# Patient Record
Sex: MALE
[De-identification: names, ages, dates, MRNs, and addresses within clinical notes are randomized per-mention and may not be internally consistent; named-entity substitution may affect disease eponyms.]

---

## 2019-08-26 PROBLEM — Z00.00 ENCOUNTER FOR PREVENTIVE HEALTH EXAMINATION: Status: ACTIVE | Noted: 2019-08-26

## 2019-08-27 ENCOUNTER — APPOINTMENT (OUTPATIENT)
Dept: PEDIATRIC ADOLESCENT MEDICINE | Facility: HOSPITAL | Age: 19
End: 2019-08-27

## 2019-09-11 ENCOUNTER — APPOINTMENT (OUTPATIENT)
Dept: PEDIATRIC ADOLESCENT MEDICINE | Facility: CLINIC | Age: 19
End: 2019-09-11

## 2020-10-28 ENCOUNTER — APPOINTMENT (OUTPATIENT)
Dept: DERMATOLOGY | Facility: CLINIC | Age: 20
End: 2020-10-28
Payer: SELF-PAY

## 2020-10-28 ENCOUNTER — APPOINTMENT (OUTPATIENT)
Dept: DERMATOLOGY | Facility: CLINIC | Age: 20
End: 2020-10-28

## 2020-10-28 ENCOUNTER — NON-APPOINTMENT (OUTPATIENT)
Age: 20
End: 2020-10-28

## 2020-10-28 VITALS — BODY MASS INDEX: 26.95 KG/M2 | WEIGHT: 210 LBS | HEIGHT: 74 IN

## 2020-10-28 DIAGNOSIS — B36.0 PITYRIASIS VERSICOLOR: ICD-10-CM

## 2020-10-28 PROCEDURE — 99203 OFFICE O/P NEW LOW 30 MIN: CPT

## 2020-10-28 RX ORDER — KETOCONAZOLE 20.5 MG/ML
2 SHAMPOO, SUSPENSION TOPICAL
Qty: 1 | Refills: 6 | Status: ACTIVE | COMMUNITY
Start: 2020-10-28 | End: 1900-01-01

## 2020-10-28 RX ORDER — FLUCONAZOLE 200 MG/1
200 TABLET ORAL
Qty: 7 | Refills: 0 | Status: ACTIVE | COMMUNITY
Start: 2020-10-28 | End: 1900-01-01

## 2021-04-15 ENCOUNTER — INPATIENT (INPATIENT)
Facility: HOSPITAL | Age: 21
LOS: 0 days | Discharge: HOME | DRG: 863 | End: 2021-04-16
Payer: COMMERCIAL

## 2021-04-15 VITALS
OXYGEN SATURATION: 97 % | HEART RATE: 80 BPM | SYSTOLIC BLOOD PRESSURE: 132 MMHG | WEIGHT: 210.1 LBS | RESPIRATION RATE: 18 BRPM | TEMPERATURE: 98 F | HEIGHT: 74 IN | DIASTOLIC BLOOD PRESSURE: 80 MMHG

## 2021-04-15 DIAGNOSIS — L05.01 PILONIDAL CYST WITH ABSCESS: ICD-10-CM

## 2021-04-15 DIAGNOSIS — Z98.890 OTHER SPECIFIED POSTPROCEDURAL STATES: Chronic | ICD-10-CM

## 2021-04-15 LAB
ALBUMIN SERPL ELPH-MCNC: 4.9 G/DL — SIGNIFICANT CHANGE UP (ref 3.3–5)
ALP SERPL-CCNC: 61 U/L — SIGNIFICANT CHANGE UP (ref 40–120)
ALT FLD-CCNC: 14 U/L — SIGNIFICANT CHANGE UP (ref 10–45)
ANION GAP SERPL CALC-SCNC: 11 MMOL/L — SIGNIFICANT CHANGE UP (ref 5–17)
AST SERPL-CCNC: 19 U/L — SIGNIFICANT CHANGE UP (ref 10–40)
BASOPHILS # BLD AUTO: 0.09 K/UL — SIGNIFICANT CHANGE UP (ref 0–0.2)
BASOPHILS NFR BLD AUTO: 0.7 % — SIGNIFICANT CHANGE UP (ref 0–2)
BILIRUB SERPL-MCNC: 0.2 MG/DL — SIGNIFICANT CHANGE UP (ref 0.2–1.2)
BUN SERPL-MCNC: 13 MG/DL — SIGNIFICANT CHANGE UP (ref 7–23)
CALCIUM SERPL-MCNC: 9.7 MG/DL — SIGNIFICANT CHANGE UP (ref 8.4–10.5)
CHLORIDE SERPL-SCNC: 100 MMOL/L — SIGNIFICANT CHANGE UP (ref 96–108)
CO2 SERPL-SCNC: 26 MMOL/L — SIGNIFICANT CHANGE UP (ref 22–31)
CREAT SERPL-MCNC: 1.01 MG/DL — SIGNIFICANT CHANGE UP (ref 0.5–1.3)
EOSINOPHIL # BLD AUTO: 0.1 K/UL — SIGNIFICANT CHANGE UP (ref 0–0.5)
EOSINOPHIL NFR BLD AUTO: 0.7 % — SIGNIFICANT CHANGE UP (ref 0–6)
GLUCOSE SERPL-MCNC: 100 MG/DL — HIGH (ref 70–99)
HCT VFR BLD CALC: 44.3 % — SIGNIFICANT CHANGE UP (ref 39–50)
HGB BLD-MCNC: 15.1 G/DL — SIGNIFICANT CHANGE UP (ref 13–17)
IMM GRANULOCYTES NFR BLD AUTO: 1.3 % — SIGNIFICANT CHANGE UP (ref 0–1.5)
LYMPHOCYTES # BLD AUTO: 25.2 % — SIGNIFICANT CHANGE UP (ref 13–44)
LYMPHOCYTES # BLD AUTO: 3.39 K/UL — HIGH (ref 1–3.3)
MCHC RBC-ENTMCNC: 29.8 PG — SIGNIFICANT CHANGE UP (ref 27–34)
MCHC RBC-ENTMCNC: 34.1 GM/DL — SIGNIFICANT CHANGE UP (ref 32–36)
MCV RBC AUTO: 87.4 FL — SIGNIFICANT CHANGE UP (ref 80–100)
MONOCYTES # BLD AUTO: 1.32 K/UL — HIGH (ref 0–0.9)
MONOCYTES NFR BLD AUTO: 9.8 % — SIGNIFICANT CHANGE UP (ref 2–14)
NEUTROPHILS # BLD AUTO: 8.39 K/UL — HIGH (ref 1.8–7.4)
NEUTROPHILS NFR BLD AUTO: 62.3 % — SIGNIFICANT CHANGE UP (ref 43–77)
NRBC # BLD: 0 /100 WBCS — SIGNIFICANT CHANGE UP (ref 0–0)
PLATELET # BLD AUTO: 457 K/UL — HIGH (ref 150–400)
POTASSIUM SERPL-MCNC: 3.9 MMOL/L — SIGNIFICANT CHANGE UP (ref 3.5–5.3)
POTASSIUM SERPL-SCNC: 3.9 MMOL/L — SIGNIFICANT CHANGE UP (ref 3.5–5.3)
PROT SERPL-MCNC: 8.3 G/DL — SIGNIFICANT CHANGE UP (ref 6–8.3)
RBC # BLD: 5.07 M/UL — SIGNIFICANT CHANGE UP (ref 4.2–5.8)
RBC # FLD: 11.5 % — SIGNIFICANT CHANGE UP (ref 10.3–14.5)
SODIUM SERPL-SCNC: 137 MMOL/L — SIGNIFICANT CHANGE UP (ref 135–145)
WBC # BLD: 13.47 K/UL — HIGH (ref 3.8–10.5)
WBC # FLD AUTO: 13.47 K/UL — HIGH (ref 3.8–10.5)

## 2021-04-15 PROCEDURE — 99285 EMERGENCY DEPT VISIT HI MDM: CPT

## 2021-04-15 PROCEDURE — 74177 CT ABD & PELVIS W/CONTRAST: CPT | Mod: 26,MG

## 2021-04-15 PROCEDURE — G1004: CPT

## 2021-04-15 PROCEDURE — 99232 SBSQ HOSP IP/OBS MODERATE 35: CPT | Mod: GC

## 2021-04-15 RX ORDER — CIPROFLOXACIN LACTATE 400MG/40ML
400 VIAL (ML) INTRAVENOUS EVERY 12 HOURS
Refills: 0 | Status: DISCONTINUED | OUTPATIENT
Start: 2021-04-15 | End: 2021-04-16

## 2021-04-15 RX ORDER — CIPROFLOXACIN LACTATE 400MG/40ML
400 VIAL (ML) INTRAVENOUS ONCE
Refills: 0 | Status: COMPLETED | OUTPATIENT
Start: 2021-04-15 | End: 2021-04-15

## 2021-04-15 RX ORDER — ENOXAPARIN SODIUM 100 MG/ML
40 INJECTION SUBCUTANEOUS DAILY
Refills: 0 | Status: DISCONTINUED | OUTPATIENT
Start: 2021-04-15 | End: 2021-04-16

## 2021-04-15 RX ORDER — METRONIDAZOLE 500 MG
500 TABLET ORAL EVERY 8 HOURS
Refills: 0 | Status: DISCONTINUED | OUTPATIENT
Start: 2021-04-15 | End: 2021-04-16

## 2021-04-15 RX ORDER — METRONIDAZOLE 500 MG
500 TABLET ORAL ONCE
Refills: 0 | Status: COMPLETED | OUTPATIENT
Start: 2021-04-15 | End: 2021-04-15

## 2021-04-15 RX ADMIN — Medication 200 MILLIGRAM(S): at 21:53

## 2021-04-15 NOTE — H&P ADULT - HISTORY OF PRESENT ILLNESS
20y M with history of pilonidal cyst presents to ED with c/o worsening drainage and chills s/p Pilonidal Cystectomy on 4/5/21 at outside hospital in [place]. Pt reports drainage x 3 days and described it as a brown and red color. Reports the pain as improved since the surgery but the drainage has persisted. Pt endorses chills, and night sweats. States his school physician has been changing his bandages and referred him to ED for further evaluation. Has been taking Tylenol 2-3 times a day for pain management and states his last dose was at 3pm today. Pt states he still has stiches in and has an appointment to get them removed earlier next week. States his surgeon started him on Bactrim yesterday. States he is allergic to Amoxicillin. [place] surgeon Dr. Judd Shea [phone]

## 2021-04-15 NOTE — ED ADULT NURSE NOTE - NSSEPSISSUSPECTED_ED_A_ED
Patient calling that she needs a referral to see a high risk OB physician.  Patient is 5 weeks pregnant.  Please advise if you want to see her prior.   No

## 2021-04-15 NOTE — H&P ADULT - NSHPPHYSICALEXAM_GEN_ALL_CORE
Gen: NAD, WN/WD  HEENT: No scleral icterus, hearing grossly intact  Neck: Supple, trachea midline  Lung: Non-labored respirations  Heart: RRR  GI: Soft, non-tender, non-distended\  Rectal: pilonidal wound with stitches, purulent drainage from middle of incision, erythematous  Extrem: WWP, no edema, palpable pulses bilaterally  Neuro: A&O x3, moves all extremities spontaneously, follows commands

## 2021-04-15 NOTE — ED CLERICAL - NS ED CLERK NOTE PRE-ARRIVAL INFORMATION; ADDITIONAL PRE-ARRIVAL INFORMATION
CC/Reason For referral: Patient is s/p pilonidal cystectomy on 4/5/21. now with increasing drainage and sweats. Started on bactrim yesterday after speaking with surgeon in [place]. drainage worse today. Sent to r/o abscess.  Preferred Consultant(if applicable): francis  Who admits for you (if needed): na  Do you have documents you would like to fax over? no  Would you still like to speak to an ED attending? if needed

## 2021-04-15 NOTE — ED ADULT NURSE NOTE - OBJECTIVE STATEMENT
20y M, A&Ox4, ambulatory, presents to the ED c/o increased drainage and chills s/p Pilonidal Cystectomy on 4/5/21 at outside hospital in [place]. Pt reports increased drainage x3 days. Drainage is thick and brownish/ red in color. Pt states pain has improved since the surgery. Pt also endorses chills, and night sweats. States his school MD has been changing his bandages and referred him to ED for further evaluation. Has been taking Tylenol 2-3 times a day for pain- last dose yesterday 3pm. Pt states he still has stitches in and has an appointment to get them removed next week. States his surgeon started him on Bactrim yesterday. Gross neuro intact, no difficulty speaking in complete sentences, pulses x 4, moving all extremities, abdomen soft nontender nondistended. Pt denies numbness/tingling, weakness, headache, dizziness, vision changes, cp, sob, cough, abd pain, n/v/d, dysuria, hematuria, bloody stools, back pain.

## 2021-04-15 NOTE — ED PROVIDER NOTE - RAPID ASSESSMENT
20 year old CARLOS marie presents to ED c/o worsening drainage s/p pilonidal cystectomy on 4/5/21 at outside hospital in [place]. Pt states reports drainage x3 days, described as brown and red in color. Pt reports that pain has improved since surgery but drainage has persisted. Pt endorses subjective fevers, chills, and night sweats. Has been taking Tylenol, last dose at 3pm. Seen by MD in [facility] and referred to ED for further eval. Pt deferring pain meds at this time. Denies nausea and vomiting     Patient was seen as a tele QDOC patient. The patient will be seen and further worked up in the main emergency department and their care will be completed by the main emergency department team along with a thorough physical exam. Receiving team will follow up on labs, analgesia, any clinical imaging, reassess and disposition as clinically indicated, all decisions regarding the progression of care will be made at their discretion.    Scribe Statement: Haley SÁNCHEZ, attest that this documentation has been prepared under the direction and in the presence of Jes Muñoz) 20 year old CARLOS marie presents to ED c/o worsening drainage and chills s/p pilonidal cystectomy on 4/5/21 at outside hospital in [place]. Pt states reports drainage x3 days, described as brown and red in color. Pt reports that pain has improved since surgery but drainage has persisted. Pt endorses subjective fevers, chills, and night sweats. Has been taking Tylenol, last dose at 3pm. Seen by MD in [facility] and referred to ED for further eval. Pt deferring pain meds at this time. Denies nausea and vomiting.    Patient was seen as a tele QDOC patient. The patient will be seen and further worked up in the main emergency department and their care will be completed by the main emergency department team along with a thorough physical exam. Receiving team will follow up on labs, analgesia, any clinical imaging, reassess and disposition as clinically indicated, all decisions regarding the progression of care will be made at their discretion.    Scribe Statement: I, Haley Frazier, attest that this documentation has been prepared under the direction and in the presence of Jes Muñoz (PA)    Jes SÁNCHEZ PA-C, personally performed the service described in the documentation recorded by the scribe in my presence, and it accurately and completely records my words and actions.

## 2021-04-15 NOTE — ED PROVIDER NOTE - OBJECTIVE STATEMENT
20y M pt presents to ED with c/o worsening drainage and chills s/p Pilonidal Cystectomy on 4/5/21 at outside hospital in [place]. Pt reports drainage x 3 days and described it as a brown and red color. Reports the pain as improved since the surgery but the drainage has persisted. Pt endorses chills, and night sweats. States his school physician has been changing his bandages and referred him to ED for further evaluation. Has been taking Tylenol 2-3 times a day for pain management and states his last dose was at 3pm today. Pt states he still has stiches in and has an appointment to get them removed earlier next week. States his surgeon started him on Bactrim yesterday. States he is allergic to Amoxicillin. 20y M pt presents to ED with c/o worsening drainage and chills s/p Pilonidal Cystectomy on 4/5/21 at outside hospital in [place]. Pt reports drainage x 3 days and described it as a brown and red color. Reports the pain as improved since the surgery but the drainage has persisted. Pt endorses chills, and night sweats. States his school physician has been changing his bandages and referred him to ED for further evaluation. Has been taking Tylenol 2-3 times a day for pain management and states his last dose was at 3pm today. Pt states he still has stiches in and has an appointment to get them removed earlier next week. States his surgeon started him on Bactrim yesterday. States he is allergic to Amoxicillin.  [place] surgeon Dr. Judd Shea [phone]

## 2021-04-15 NOTE — ED PROVIDER NOTE - PHYSICAL EXAMINATION
I have reviewed the triage vital signs.  Const: AAOx3, in NAD  Eyes: no conjunctival injection  HENT: NCAT, Neck supple  CV: RRR, +S1, S2  Resp: CTAB, no respiratory distress  GI: Abdomen soft, NTND, no guarding. Midline vertical surgical site in buttocks crease superior to anus, no swelling/erythema, mild TTP along caudal aspect, purulent, malodorous drainage from caudal aspect  Extremities: No peripheral edema,  2+ radial and DP pulses  Skin: Warm, well perfused, no rash  MSK: No gross deformities appreciated  Neuro: No focal sensory or motor deficits  Psych: Appropriate mood and affect

## 2021-04-15 NOTE — H&P ADULT - ASSESSMENT
ASSESSMENT:  20y M with history of pilonidal cyst presents to ED with c/o worsening drainage and chills s/p Pilonidal Cystectomy on 4/5/21 at outside hospital in [place] n/w wound infection    PLAN:    - Discussed with Dr. Clarke  - Admit to [facility] under Dr. Clarke  - NPO/IVF  - Cipro/Flagyl for wound infection  - Wound partially opened at bedside and packed  - F/u AM labs and wound cultures  - DVT ppx Lovenox    [id number]

## 2021-04-15 NOTE — ED PROVIDER NOTE - PROGRESS NOTE DETAILS
Jarod, PGY2 - pt seen by surg, recommending cipro/flagyl & admission under Dr. Clarke. Surg to remove sutures at bedside & leave site open to drain

## 2021-04-15 NOTE — H&P ADULT - ATTENDING COMMENTS
I have seen and evaluated patient and agree with resident assessment and plan.  Chart and data reviewed.  Wound clean.  No erythema.  Minimal drainage.  Non-tender.  WBC now normal and patient afebrile.  OK to DC home with packing changes and sitz baths.  Packing because wound edges want to coapt.  No abx needed.  No cellulitis  and drainage no longer purulent.

## 2021-04-15 NOTE — ED ADULT NURSE REASSESSMENT NOTE - NS ED NURSE REASSESS COMMENT FT1
pt opted to wait in his vehicle instead of in triage area, unable to perform hourly reassessment and vital signs

## 2021-04-15 NOTE — H&P ADULT - NSHPLABSRESULTS_GEN_ALL_CORE
----- Message from Tahmina Alarcon MD sent at 6/7/2019 10:51 AM CDT -----  Can we have MFM reach back out to patient or can we give her the number to jessica them to schedule?  Thanks!!  
Contacted MFM at [facility]. No answer. Then called patient and instructed her to call MFM at [facility] for appointment.   
15.1   13.47 )-----------( 457      ( 15 Apr 2021 18:23 )             44.3       04-15    137  |  100  |  13  ----------------------------<  100<H>  3.9   |  26  |  1.01    Ca    9.7      15 Apr 2021 18:23    TPro  8.3  /  Alb  4.9  /  TBili  0.2  /  DBili  x   /  AST  19  /  ALT  14  /  AlkPhos  61  04-15      IMAGING:    < from: CT Abdomen and Pelvis w/ IV Cont (04.15.21 @ 20:29) >    FINDINGS:  LOWER CHEST: Within normal limits.    LIVER: Within normal limits.  BILE DUCTS: Normal caliber.  GALLBLADDER: Within normal limits.  SPLEEN: Within normal limits.  PANCREAS: Within normal limits.  ADRENALS: Within normal limits.  KIDNEYS/URETERS: Symmetric enhancement no hydronephrosis.    BLADDER: Within normal limits.  REPRODUCTIVE ORGANS: Prostate within normal limits.    BOWEL: No bowel obstruction. Appendix is normal.    PERITONEUM: No ascites.    VESSELS: Within normal limits.  RETROPERITONEUM/LYMPH NODES: No lymphadenopathy.  ABDOMINAL WALL: Within the posterior soft tissues there is a 7.3 x 2.6 x 3.7 cm fluid collection with a locule of air consistent with pilonidal abscess with adjacent subcutaneous tissueinflammatory change series 6 image 64; series 3 image 100.  BONES: Within normal limits.    IMPRESSION:  Pilonidal abscess as delineated above.    < end of copied text >

## 2021-04-15 NOTE — ED PROVIDER NOTE - CLINICAL SUMMARY MEDICAL DECISION MAKING FREE TEXT BOX
attending Love: recent pilonidal cystectomy with concern for postop abscess. Will obtain labs, CT A/P, surgical evaluation, reassess. Declines pain medication on initial exam

## 2021-04-16 ENCOUNTER — TRANSCRIPTION ENCOUNTER (OUTPATIENT)
Age: 21
End: 2021-04-16

## 2021-04-16 VITALS
SYSTOLIC BLOOD PRESSURE: 112 MMHG | TEMPERATURE: 98 F | HEART RATE: 64 BPM | DIASTOLIC BLOOD PRESSURE: 70 MMHG | OXYGEN SATURATION: 97 % | RESPIRATION RATE: 18 BRPM

## 2021-04-16 LAB
ANION GAP SERPL CALC-SCNC: 14 MMOL/L — SIGNIFICANT CHANGE UP (ref 5–17)
BUN SERPL-MCNC: 12 MG/DL — SIGNIFICANT CHANGE UP (ref 7–23)
CALCIUM SERPL-MCNC: 9.8 MG/DL — SIGNIFICANT CHANGE UP (ref 8.4–10.5)
CHLORIDE SERPL-SCNC: 102 MMOL/L — SIGNIFICANT CHANGE UP (ref 96–108)
CO2 SERPL-SCNC: 25 MMOL/L — SIGNIFICANT CHANGE UP (ref 22–31)
CREAT SERPL-MCNC: 1.01 MG/DL — SIGNIFICANT CHANGE UP (ref 0.5–1.3)
GLUCOSE SERPL-MCNC: 84 MG/DL — SIGNIFICANT CHANGE UP (ref 70–99)
HCT VFR BLD CALC: 42.7 % — SIGNIFICANT CHANGE UP (ref 39–50)
HGB BLD-MCNC: 14.3 G/DL — SIGNIFICANT CHANGE UP (ref 13–17)
MAGNESIUM SERPL-MCNC: 2.4 MG/DL — SIGNIFICANT CHANGE UP (ref 1.6–2.6)
MCHC RBC-ENTMCNC: 30 PG — SIGNIFICANT CHANGE UP (ref 27–34)
MCHC RBC-ENTMCNC: 33.5 GM/DL — SIGNIFICANT CHANGE UP (ref 32–36)
MCV RBC AUTO: 89.5 FL — SIGNIFICANT CHANGE UP (ref 80–100)
NRBC # BLD: 0 /100 WBCS — SIGNIFICANT CHANGE UP (ref 0–0)
PHOSPHATE SERPL-MCNC: 3.9 MG/DL — SIGNIFICANT CHANGE UP (ref 2.5–4.5)
PLATELET # BLD AUTO: 382 K/UL — SIGNIFICANT CHANGE UP (ref 150–400)
POTASSIUM SERPL-MCNC: 3.7 MMOL/L — SIGNIFICANT CHANGE UP (ref 3.5–5.3)
POTASSIUM SERPL-SCNC: 3.7 MMOL/L — SIGNIFICANT CHANGE UP (ref 3.5–5.3)
RBC # BLD: 4.77 M/UL — SIGNIFICANT CHANGE UP (ref 4.2–5.8)
RBC # FLD: 11.4 % — SIGNIFICANT CHANGE UP (ref 10.3–14.5)
SARS-COV-2 RNA SPEC QL NAA+PROBE: SIGNIFICANT CHANGE UP
SODIUM SERPL-SCNC: 141 MMOL/L — SIGNIFICANT CHANGE UP (ref 135–145)
WBC # BLD: 10.08 K/UL — SIGNIFICANT CHANGE UP (ref 3.8–10.5)
WBC # FLD AUTO: 10.08 K/UL — SIGNIFICANT CHANGE UP (ref 3.8–10.5)

## 2021-04-16 PROCEDURE — 87186 SC STD MICRODIL/AGAR DIL: CPT

## 2021-04-16 PROCEDURE — 83735 ASSAY OF MAGNESIUM: CPT

## 2021-04-16 PROCEDURE — U0005: CPT

## 2021-04-16 PROCEDURE — 84100 ASSAY OF PHOSPHORUS: CPT

## 2021-04-16 PROCEDURE — 85027 COMPLETE CBC AUTOMATED: CPT

## 2021-04-16 PROCEDURE — U0003: CPT

## 2021-04-16 PROCEDURE — 99231 SBSQ HOSP IP/OBS SF/LOW 25: CPT | Mod: GC

## 2021-04-16 PROCEDURE — 87040 BLOOD CULTURE FOR BACTERIA: CPT

## 2021-04-16 PROCEDURE — 99285 EMERGENCY DEPT VISIT HI MDM: CPT

## 2021-04-16 PROCEDURE — 74177 CT ABD & PELVIS W/CONTRAST: CPT

## 2021-04-16 PROCEDURE — 87070 CULTURE OTHR SPECIMN AEROBIC: CPT

## 2021-04-16 PROCEDURE — 87077 CULTURE AEROBIC IDENTIFY: CPT

## 2021-04-16 PROCEDURE — 80048 BASIC METABOLIC PNL TOTAL CA: CPT

## 2021-04-16 PROCEDURE — 80053 COMPREHEN METABOLIC PANEL: CPT

## 2021-04-16 PROCEDURE — 85025 COMPLETE CBC W/AUTO DIFF WBC: CPT

## 2021-04-16 RX ADMIN — Medication 100 MILLIGRAM(S): at 15:23

## 2021-04-16 RX ADMIN — Medication 200 MILLIGRAM(S): at 10:40

## 2021-04-16 RX ADMIN — Medication 100 MILLIGRAM(S): at 00:00

## 2021-04-16 RX ADMIN — Medication 100 MILLIGRAM(S): at 09:00

## 2021-04-16 NOTE — DISCHARGE NOTE PROVIDER - PROVIDER TOKENS
FREE:[LAST:[Monse],FIRST:[Judd],PHONE:[phone]],FAX:[(   )    -],ADDRESS:[[phone]'],FOLLOWUP:[1-3 days]]

## 2021-04-16 NOTE — DISCHARGE NOTE NURSING/CASE MANAGEMENT/SOCIAL WORK - PATIENT PORTAL LINK FT
You can access the FollowMyHealth Patient Portal offered by [facility] by registering at the following website: [email or web address]. By joining [facility]’s FollowMyHealth portal, you will also be able to view your health information using other applications (apps) compatible with our system.

## 2021-04-16 NOTE — DISCHARGE NOTE PROVIDER - HOSPITAL COURSE
20y M with history of pilonidal cyst presents to ED with c/o worsening drainage and chills s/p Pilonidal Cystectomy on 4/5/21 at outside hospital in [place]. Pt reports drainage x 3 days and described it as a brown and red color. Reports the pain as improved since the surgery but the drainage has persisted. WBC in the ED 10.8, and CT performed showing:  'ABDOMINAL WALL: Within the posterior soft tissues there is a 7.3 x 2.6 x 3.7 cm fluid collection with a locule of air consistent with pilonidal abscess with adjacent subcutaneous tissueinflammatory change series 6 image 64; series 3 image 100.  IMPRESSION:  Pilonidal abscess as delineated above'     Patients abscess was drained and started on IV antibx. Patient was sent home with instructions to pack the site 1-2x a day.   All questions were answered and patient safely discharged. 20y M with history of pilonidal cyst presents to ED with c/o worsening drainage and chills s/p Pilonidal Cystectomy on 4/5/21 at outside hospital in [place]. Pt reports drainage x 3 days and described it as a brown and red color. Reports the pain as improved since the surgery but the drainage has persisted. WBC in the ED 10.8, and CT performed showing:  'ABDOMINAL WALL: Within the posterior soft tissues there is a 7.3 x 2.6 x 3.7 cm fluid collection with a locule of air consistent with pilonidal abscess with adjacent subcutaneous tissueinflammatory change series 6 image 64; series 3 image 100.  IMPRESSION:  Pilonidal abscess as delineated above'     Patients abscess from PREVIOUS surgery that was not performed by a [facility] Physician was drained and started on IV antibx. Patient was sent home with instructions to pack the site 1-2x a day.   All questions were answered and patient safely discharged.

## 2021-04-16 NOTE — DISCHARGE NOTE PROVIDER - YES NO FOR MLM POSITIVE OR NEGATIVE COVID RESULT
Plan to start Clobetasol twice daily for two weeks, then taper to twice a week. If symptoms do not resolve, patient to return to clinic for a biopsy  
,

## 2021-04-16 NOTE — PROGRESS NOTE ADULT - ASSESSMENT
20M with history of pilonidal cyst presents to ED with c/o worsening drainage and chills s/p Pilonidal Cystectomy on 4/5/21 at outside hospital in [place] n/w wound infection    PLAN:  - Pain control PRN  - NPO/IVF  - Cipro/Flagyl for wound infection  - Wound partially opened at bedside and packed  - F/u AM labs and wound cultures  - DVT ppx Lovenox    Green Surgery  7022 20M with history of pilonidal cyst presents to ED with c/o worsening drainage and chills s/p Pilonidal Cystectomy on 4/5/21 at outside hospital in [place] n/w wound infection    PLAN:  - Pain control PRN  - NPO/IVF  - Cipro/Flagyl for wound infection  - Wound partially opened at bedside and packed  - F/u AM labs and wound cultures  - DVT ppx Lovenox    Discharge TODAY* with packing to wound 1-2x a day.   No home antibx necessary     Green Surgery  [id number]

## 2021-04-16 NOTE — PROGRESS NOTE ADULT - SUBJECTIVE AND OBJECTIVE BOX
HPI:  19yo Male with Hx of Pilonidal Cystectomy on 4/5/21 at outside hospital in [place] who p/w drainage x 3 days    24h Events:   - Overnight, no acute events    Subjective:   Patient examined at bedside this AM. Reports     Objective:  Vital Signs  T(C): 36.7 (04-16 @ 00:18), Max: 36.7 (04-15 @ 17:47)  HR: 61 (04-16 @ 00:18) (61 - 86)  BP: 121/61 (04-16 @ 00:18) (121/61 - 132/80)  RR: 16 (04-16 @ 00:18) (16 - 18)  SpO2: 100% (04-16 @ 00:18) (97% - 100%)    Physical Exam:  General: alert and oriented, NAD  Resp: airway patent, respirations unlabored  CVS: regular rate and rhythm  Abdomen: soft, nontender, nondistended  Rectal: pilonidal wound with stitches, purulent drainage from middle of incision, erythematous  Extremities: no edema  Skin: warm, dry, appropriate color    Labs:                        15.1   13.47 )-----------( 457      ( 15 Apr 2021 18:23 )             44.3   04-15    137  |  100  |  13  ----------------------------<  100<H>  3.9   |  26  |  1.01    Ca    9.7      15 Apr 2021 18:23    TPro  8.3  /  Alb  4.9  /  TBili  0.2  /  DBili  x   /  AST  19  /  ALT  14  /  AlkPhos  61  04-15    CAPILLARY BLOOD GLUCOSE          Medications:   MEDICATIONS  (STANDING):  ciprofloxacin   IVPB 400 milliGRAM(s) IV Intermittent every 12 hours  enoxaparin Injectable 40 milliGRAM(s) SubCutaneous daily  metroNIDAZOLE  IVPB 500 milliGRAM(s) IV Intermittent every 8 hours    MEDICATIONS  (PRN):

## 2021-04-16 NOTE — DISCHARGE NOTE PROVIDER - NSDCCPCAREPLAN_GEN_ALL_CORE_FT
PRINCIPAL DISCHARGE DIAGNOSIS  Diagnosis: Pilonidal abscess  Assessment and Plan of Treatment: WOUND CARE: Packing to wound 1-2x  a day  NOTIFY YOUR SURGEON IF: You have any bleeding that does not stop, any fever (over 100.4 F) or chills, persistent nausea/vomiting with inability to tolerate food or liquids, persistent drainage at the site, increased swelling or if your pain is not controlled on your discharge pain medications.  FOLLOW-UP:  Please follow up with your surgeon Dr. Judd Shea [phone]' in one week regarding your hospitalization.       PRINCIPAL DISCHARGE DIAGNOSIS  Diagnosis: Pilonidal abscess  Assessment and Plan of Treatment: WOUND CARE: Iodoform packing to wound( site: Pilonodal sinus - tailbone) 1-2x  a day. Cover with dry gauze and tape after.   NOTIFY YOUR SURGEON IF: You have any bleeding that does not stop, any fever (over 100.4 F) or chills, persistent nausea/vomiting with inability to tolerate food or liquids, persistent drainage at the site, increased swelling or if your pain is not controlled on your discharge pain medications.  FOLLOW-UP:  Please follow up with your surgeon Dr. Judd Shea [phone]' in one week regarding your hospitalization.

## 2021-04-16 NOTE — DISCHARGE NOTE NURSING/CASE MANAGEMENT/SOCIAL WORK - NSDCPNINST_GEN_ALL_CORE
If you have any severe pain, fevers, chills, etc call your PCP, 911 or go to the nearest emergency room. Follow up with your PCP and take your medications as prescribed.

## 2021-04-17 LAB
-  AMIKACIN: SIGNIFICANT CHANGE UP
-  AMOXICILLIN/CLAVULANIC ACID: SIGNIFICANT CHANGE UP
-  AMPICILLIN/SULBACTAM: SIGNIFICANT CHANGE UP
-  AMPICILLIN: SIGNIFICANT CHANGE UP
-  AZTREONAM: SIGNIFICANT CHANGE UP
-  CEFAZOLIN: SIGNIFICANT CHANGE UP
-  CEFEPIME: SIGNIFICANT CHANGE UP
-  CEFOXITIN: SIGNIFICANT CHANGE UP
-  CEFTRIAXONE: SIGNIFICANT CHANGE UP
-  CIPROFLOXACIN: SIGNIFICANT CHANGE UP
-  ERTAPENEM: SIGNIFICANT CHANGE UP
-  GENTAMICIN: SIGNIFICANT CHANGE UP
-  IMIPENEM: SIGNIFICANT CHANGE UP
-  LEVOFLOXACIN: SIGNIFICANT CHANGE UP
-  MEROPENEM: SIGNIFICANT CHANGE UP
-  PIPERACILLIN/TAZOBACTAM: SIGNIFICANT CHANGE UP
-  TOBRAMYCIN: SIGNIFICANT CHANGE UP
-  TRIMETHOPRIM/SULFAMETHOXAZOLE: SIGNIFICANT CHANGE UP
CULTURE RESULTS: SIGNIFICANT CHANGE UP
METHOD TYPE: SIGNIFICANT CHANGE UP
ORGANISM # SPEC MICROSCOPIC CNT: SIGNIFICANT CHANGE UP
SPECIMEN SOURCE: SIGNIFICANT CHANGE UP

## 2021-04-21 LAB
CULTURE RESULTS: SIGNIFICANT CHANGE UP
SPECIMEN SOURCE: SIGNIFICANT CHANGE UP

## 2021-05-25 ENCOUNTER — APPOINTMENT (OUTPATIENT)
Dept: COLORECTAL SURGERY | Facility: CLINIC | Age: 21
End: 2021-05-25
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
